# Patient Record
Sex: MALE | Race: WHITE | NOT HISPANIC OR LATINO | Employment: OTHER | URBAN - NONMETROPOLITAN AREA
[De-identification: names, ages, dates, MRNs, and addresses within clinical notes are randomized per-mention and may not be internally consistent; named-entity substitution may affect disease eponyms.]

---

## 2018-02-24 ENCOUNTER — DOCUMENTATION ONLY (OUTPATIENT)
Dept: FAMILY MEDICINE | Facility: OTHER | Age: 58
End: 2018-02-24

## 2018-02-24 PROBLEM — E78.5 HYPERLIPIDEMIA: Status: ACTIVE | Noted: 2018-02-24

## 2018-02-24 PROBLEM — D12.6 BENIGN NEOPLASM OF COLON: Status: ACTIVE | Noted: 2018-02-24

## 2018-02-24 PROBLEM — I26.99 PULMONARY EMBOLISM (H): Status: ACTIVE | Noted: 2018-02-24

## 2018-02-24 PROBLEM — I69.959 HEMIPLEGIA, LATE EFFECT OF CEREBROVASCULAR DISEASE (H): Status: ACTIVE | Noted: 2018-02-24

## 2018-02-24 PROBLEM — Z86.59 PERSONAL HISTORY OF OTHER MENTAL DISORDER: Status: ACTIVE | Noted: 2018-02-24

## 2018-02-24 PROBLEM — Z72.0 TOBACCO ABUSE: Status: ACTIVE | Noted: 2018-02-24

## 2018-02-24 PROBLEM — F10.10 ALCOHOL ABUSE: Status: ACTIVE | Noted: 2018-02-24

## 2021-08-03 ENCOUNTER — APPOINTMENT (OUTPATIENT)
Dept: ULTRASOUND IMAGING | Facility: OTHER | Age: 61
End: 2021-08-03
Attending: PHYSICIAN ASSISTANT
Payer: MEDICARE

## 2021-08-03 ENCOUNTER — HOSPITAL ENCOUNTER (EMERGENCY)
Facility: OTHER | Age: 61
Discharge: HOME | End: 2021-08-03
Attending: PHYSICIAN ASSISTANT | Admitting: PHYSICIAN ASSISTANT
Payer: MEDICARE

## 2021-08-03 VITALS
HEART RATE: 100 BPM | DIASTOLIC BLOOD PRESSURE: 84 MMHG | RESPIRATION RATE: 20 BRPM | WEIGHT: 165 LBS | SYSTOLIC BLOOD PRESSURE: 141 MMHG | HEIGHT: 65 IN | OXYGEN SATURATION: 97 % | BODY MASS INDEX: 27.49 KG/M2 | TEMPERATURE: 97.6 F

## 2021-08-03 DIAGNOSIS — I82.411 ACUTE DEEP VEIN THROMBOSIS (DVT) OF FEMORAL VEIN OF RIGHT LOWER EXTREMITY (H): ICD-10-CM

## 2021-08-03 LAB
ANION GAP SERPL CALCULATED.3IONS-SCNC: 8 MMOL/L (ref 3–14)
BUN SERPL-MCNC: 12 MG/DL (ref 7–25)
CALCIUM SERPL-MCNC: 9.6 MG/DL (ref 8.6–10.3)
CHLORIDE BLD-SCNC: 97 MMOL/L (ref 98–107)
CO2 SERPL-SCNC: 29 MMOL/L (ref 21–31)
CREAT SERPL-MCNC: 1.35 MG/DL (ref 0.7–1.3)
GFR SERPL CREATININE-BSD FRML MDRD: 56 ML/MIN/1.73M2
GLUCOSE BLD-MCNC: 92 MG/DL (ref 70–105)
POTASSIUM BLD-SCNC: 4.1 MMOL/L (ref 3.5–5.1)
SODIUM SERPL-SCNC: 134 MMOL/L (ref 134–144)

## 2021-08-03 PROCEDURE — 99284 EMERGENCY DEPT VISIT MOD MDM: CPT | Mod: 25 | Performed by: PHYSICIAN ASSISTANT

## 2021-08-03 PROCEDURE — 93971 EXTREMITY STUDY: CPT | Mod: RT

## 2021-08-03 PROCEDURE — 80048 BASIC METABOLIC PNL TOTAL CA: CPT | Performed by: PHYSICIAN ASSISTANT

## 2021-08-03 PROCEDURE — 93926 LOWER EXTREMITY STUDY: CPT | Mod: RT

## 2021-08-03 PROCEDURE — 36415 COLL VENOUS BLD VENIPUNCTURE: CPT | Performed by: PHYSICIAN ASSISTANT

## 2021-08-03 PROCEDURE — 99291 CRITICAL CARE FIRST HOUR: CPT | Mod: 25 | Performed by: PHYSICIAN ASSISTANT

## 2021-08-03 PROCEDURE — 99219 PR INITIAL OBSERVATION CARE,LEVEL II: CPT | Performed by: PHYSICIAN ASSISTANT

## 2021-08-03 ASSESSMENT — MIFFLIN-ST. JEOR: SCORE: 1475.32

## 2021-08-03 NOTE — ED NOTES
Vulnerable adult report filed.   Reference number [id number].   at hospital contacted and message left to call ED.

## 2021-08-03 NOTE — ED TRIAGE NOTES
Pt to ER with brother from home via private vehicle with c/o swelling and pain to right leg.  Brother noticed it only today, believe it has been this way for 5 days possibly.  Hx blood clot.  Pain has been limping, seems in pain.  Has not seen PCP, brother is care giver and unsure who PCP is. Smokes 2 pks/day.

## 2021-08-03 NOTE — ED PROVIDER NOTES
History     Chief Complaint   Patient presents with     Leg Swelling     HPI  Damon Mcgowan is a 61 year old male who presents to the emergency department for evaluation with his brother because of swelling to his right lower extremity he notices been increasing over the last 5 days.  He has had a previous history of clot and has been on anticoagulation previous currently he is not on any anticoagulation has had previous stroke he smokes 2 packs a day he does not have any headaches dizziness numbers disturbances runny nose nasal congestion sinus pressure sore throat or cough no chest pain or shortness of breath no abdominal pain rashes or trauma his right lower extremity is more swollen than the left and painful.  It is warm to touch    Allergies:  Allergies   Allergen Reactions     Bee Venom Unknown       Problem List:    Patient Active Problem List    Diagnosis Date Noted     Hemiplegia, late effect of cerebrovascular disease (H) 02/24/2018     Priority: Medium     Overview:   with resultant expressive aphasia and right-sided weakness.  Stroke rehab and   speech therapy at [facility]       Alcohol abuse 02/24/2018     Priority: Medium     Personal history of other mental disorder 02/24/2018     Priority: Medium     Overview:   post stroke       Hyperlipidemia 02/24/2018     Priority: Medium     Pulmonary embolism (H) 02/24/2018     Priority: Medium     Overview:   Massive saddle pulmonary embolus with respiratory failure requiring mechanical   ventilation and thrombolytics.  Hospital course complicated by episode of   pulseless electrical activity with subsequent fevers and negative blood   cultures.       Tobacco abuse 02/24/2018     Priority: Medium     Overview:   one to two packs per day       Benign neoplasm of colon 02/24/2018     Priority: Medium     Clubbing of fingers 12/09/2016     Priority: Medium     History of pulmonary embolism 12/09/2016     Priority: Medium     Poor circulation of  extremity 2016     Priority: Medium     Status post amputation of lesser toe of right foot (H) 2016     Priority: Medium        Past Medical History:    Past Medical History:   Diagnosis Date     Alcohol dependence with withdrawal (H)      Localized swelling, mass or lump of neck      Other specified abnormal findings of blood chemistry      Personal history of other endocrine, nutritional and metabolic disease      Personal history of other specified conditions (CODE)      Saddle embolus of pulmonary artery without acute cor pulmonale (H)        Past Surgical History:    Past Surgical History:   Procedure Laterality Date     COLONOSCOPY      2003,Colonoscopy notable for 2 tubular adenomas with low-grade dysplasia, recommended followup                         colonoscopy in .     ESOPHAGOSCOPY, GASTROSCOPY, DUODENOSCOPY (EGD), COMBINED           OTHER SURGICAL HISTORY      01/10/00,,CAROTID ENDARDECTOMY,Left carotid endarterectomy by Dr. Cordero with subsequent angiography revealing complete occlusion of the left internal carotid artery and extension of infarct     OTHER SURGICAL HISTORY      ,2050,BIOPSY CERVICAL SPINE,Left neck mass biopsy and endoscopy by Dr. Cheng     OTHER SURGICAL HISTORY      3/03/05,,CVL CORONARY ANGIOGRAM,Angiogram and removal of carotid patch graft, Dr. Cordero and Dr. Barlow     OTHER SURGICAL HISTORY      2006,[id number],AMPUTATION,Right fourth an fifth toe amputations /for ulceration and necrosis secondary to ischemia.       Family History:    Family History   Problem Relation Age of Onset     Heart Disease Mother         Heart Disease,98% blocked     Other - See Comments Father         rCOPD and recurrent spontaneous pneumothorax     Other - See Comments Paternal Grandfather          hypercoagulation syndrome.     Genetic Disorder Other         Genetic,Father COPD and recurrent spontaneous pneumothorax -Mother      at age 62 of  "sudden death-Sister       Lupus-PGF and paternal aunt and paternal uncle with hypercoagulation syndrome.     Other - See Comments Sister         Lupus     Other - See Comments Paternal Aunt          hypercoagulation syndrome.     Other - See Comments Paternal Uncle          hypercoagulation syndrome.       Social History:  Marital Status:  Single [1]  Social History     Tobacco Use     Smoking status: Current Every Day Smoker     Packs/day: 3.00     Years: 40.00     Pack years: 120.00     Types: Cigarettes     Smokeless tobacco: Never Used   Substance Use Topics     Alcohol use: None     Comment: daily, 5 beers     Drug use: Not Currently        Medications:    rivaroxaban ANTICOAGULANT (XARELTO) 15 MG TABS tablet          Review of Systems   Please see HPI for pertinent positives and negatives.  All other systems reviewed and found to be negative.      Physical Exam   BP: (!) 153/82  Pulse: 100  Temp: 97.6  F (36.4  C)  Resp: 20  Height: 165.1 cm (5' 5\")  Weight: 74.8 kg (165 lb)  SpO2: 100 %      Physical Exam   Exam:  Constitutional: healthy, alert and no distress  Head: Normocephalic.    Neck: Neck supple.    ENT: ENT exam normal, no neck nodes or sinus tenderness  Cardiovascular:  RRR. No murmurs, clicks gallops or rub  Respiratory:  Lungs clear  Gastrointestinal: Abdomen soft, non-tender. BS normal. No masses, organomegaly  : Deferred  Musculoskeletal: Swelling 2+ distally of the right lower extremity it is warm to touch pulses diminished.  No areas of infection or cellulitis  Skin: no suspicious lesions or rashes   Neurologic: At baseline  Psychiatric: mentation appears normal          ED Course             Results for orders placed or performed during the hospital encounter of 08/03/21 (from the past 24 hour(s))   US Lower Extremity Venous Duplex Right    Narrative    Procedure: US LOWER EXTREMITY VENOUS DUPLEX RIGHT    HISTORY:  pain edema.    TECHNIQUE: Grayscale, color Doppler and compression views of " the deep  venous structures of the right lower extremity.    COMPARISON: None.    FINDINGS:    Interrogation of the deep venous structures from the right common  femoral vein through the veins of the proximal calf demonstrate  extensive predominantly occlusive thrombus throughout the femoral and  popliteal veins. The calf veins are not well assessed. Superficial  thrombus is seen in a portion of the greater saphenous vein.      Impression    IMPRESSION:     Large volume DVT of the right lower extremity. Superficial thrombus of  the right lower extremity as well.      JUAN DIEGO HARVEY MD         SYSTEM ID:  [id number]   US Lower Extremity Arterial Duplex Right    Narrative    US LOWER EXTREMITY ARTERIAL DUPLEX RIGHT    HISTORY: pain edema .    COMPARISON: 8/3/2021.    TECHNIQUE: Grayscale and spectral Doppler assessment of the arteries  of the right lower extremity.    FINDINGS:    Systolic velocity within the major arteries of the right lower  extremity are as follows, in centimeters per second:  Proximal , distal , profunda femoralis to 61, proximal  SFA to 22, mid , distal SFA 79, proximal popliteal 57, distal  popliteal 105, posterior tibial 24, dorsalis pedis 48, peroneal 14.        Impression    IMPRESSION:     Flow acceleration between the distal common femoral artery and the  proximal profunda femoral and SFA arteries suggests mild to moderate  distal CFA/bifurcation stenosis.     No critical flow-limiting arterial stenosis is suggested.      JUAN DIEGO HARVEY MD         SYSTEM ID:  [id number]   Basic metabolic panel   Result Value Ref Range    Sodium 134 134 - 144 mmol/L    Potassium 4.1 3.5 - 5.1 mmol/L    Chloride 97 (L) 98 - 107 mmol/L    Carbon Dioxide (CO2) 29 21 - 31 mmol/L    Anion Gap 8 3 - 14 mmol/L    Urea Nitrogen 12 7 - 25 mg/dL    Creatinine 1.35 (H) 0.70 - 1.30 mg/dL    Calcium 9.6 8.6 - 10.3 mg/dL    Glucose 92 70 - 105 mg/dL    GFR Estimate 56 (L) >60 mL/min/1.73m2        Medications - No data to display    Assessments & Plan (with Medical Decision Making)     There is a high probability of clinically significant deterioration in this patient's condition which required the highest level of my preparedness to intervene urgently.    I have reviewed the nursing notes.    I have reviewed the findings, diagnosis, plan and need for follow up with the patient.Differential diagnosis considerations included strain, sprain, fracture, contusion, dislocation, internal ligament derangement, disk herniation-radiculopathy, arthritis, bursitis, tendonitis, DVT, vascular occlusion, claudication, compartment syndrome, cellulitis.  Leila gentleman who presents emergency department for evaluation of right lower extremity swelling and pain.  Found to have DVT in the right lower extremity venous ultrasound and the arterial ultrasound shows some stenosis.  With the above finding it was recommended that the patient be placed on Xarelto.  He did have a chemistry that showed normal renal function.  He will be placed after discussing with pharmacy on an anticoagulant consistent with Xarelto and he will take 15 mg twice daily for 21 days and then thereafter 20 mg daily.  Prior to the administration of Xarelto we did go over the risk benefits and alternatives the patient has chosen Xarelto versus Coumadin.  Patient also notes that he understands the risk especially of bleeding and if he notices any bleeding or he hits his head or has any traumatic injury he is required and it is recommended to return to the emergency department immediately and seek medical attention.  It is recommended that the patient follow-up with his primary care physician this week so they can follow him closely and continue medications after prescription has .  Independently reviewed the radiographs laboratory studies coordinated care for the patient spoke with the patient at the bedside as well as pharmacy in regards to  his Xarelto prescription and the risk of bleeding and risk while taking the Xarelto and the expected course.  I explained my diagnostic considerations and recommendations and the patient voiced an understanding and was in agreement with the treatment plan. All questions were answered. We discussed potential side effects of any prescribed or recommended therapies, as well as expectations for response to treatments.    New Prescriptions    RIVAROXABAN ANTICOAGULANT (XARELTO) 15 MG TABS TABLET    Take 1 tablet (15 mg) by mouth daily (with dinner)     Aggregate Critical Care Time is 30 minutes.  This was the time seeing the patient at the bedside while the patient was critical.  My time did not include any pertinent procedures or activities that did not contribute to the patient's care while the patient was critical.        Final diagnoses:   Acute deep vein thrombosis (DVT) of femoral vein of right lower extremity (H)       8/3/2021   [facility] PA-C  08/03/21 1941

## 2021-08-03 NOTE — DISCHARGE INSTRUCTIONS
FOLLOW UP WITH YOUR DOCTOR IN THE NEXT 24 HOURS FOR A RECHECK  RETURN TO THE ER IF SYMPTOMS WORSEN OR IF YOU HAVE FURTHER CONCERNS   TAKE ALL PREVIOUS AND ANY NEW MEDS AS PRESCRIBED       THE DISCHARGE INSTRUCTIONS ARE INTENDED AS A COMPLEMENT TO AND NOT A REPLACEMENT FOR THE VERBAL INSTRUCTIONS THAT I HAVE PROVIDED YOU TONIGHT. AFTER GOING OVER THE PLAN OF CARE TONIGHT, INCLUDING SIDE EFFECTS, ADVERSE REACTIONS OF ALL MEDICATIONS PRESCRIBED (THIS WILL BE PROVIDED TO YOU AT THE PHARMACY ALSO) AND PROVIDING YOU WITH THE VERBAL INSTRUCTIONS AT DISCHARGE YOU HAVE HAD THE OPPORTUNITY TO ASK FURTHER QUESTIONS AND TO CLARIFY UNCERTAINTIES. SINCE YOU HAVE NO FURTHER QUESTIONS PLEASE HAVE A WONDERFUL SAFE EVENING THANK YOU.         WORK/SCHOOL NOTE;    PLEASE EXCUSE THE ABOVE PATIENT FROM EITHER WORK OR SCHOOL FOR THE DAY/NIGHT.

## 2021-08-04 ENCOUNTER — OFFICE VISIT (OUTPATIENT)
Dept: FAMILY MEDICINE | Facility: OTHER | Age: 61
End: 2021-08-04
Attending: FAMILY MEDICINE
Payer: MEDICARE

## 2021-08-04 VITALS
RESPIRATION RATE: 20 BRPM | SYSTOLIC BLOOD PRESSURE: 130 MMHG | OXYGEN SATURATION: 93 % | DIASTOLIC BLOOD PRESSURE: 68 MMHG | WEIGHT: 179 LBS | TEMPERATURE: 97.7 F | BODY MASS INDEX: 29.79 KG/M2 | HEART RATE: 92 BPM

## 2021-08-04 DIAGNOSIS — I82.411 ACUTE DEEP VEIN THROMBOSIS (DVT) OF FEMORAL VEIN OF RIGHT LOWER EXTREMITY (H): Primary | ICD-10-CM

## 2021-08-04 DIAGNOSIS — I70.201 FEMORAL ARTERY STENOSIS, RIGHT (H): ICD-10-CM

## 2021-08-04 PROCEDURE — G0463 HOSPITAL OUTPT CLINIC VISIT: HCPCS | Mod: 25

## 2021-08-04 PROCEDURE — 99214 OFFICE O/P EST MOD 30 MIN: CPT | Performed by: FAMILY MEDICINE

## 2021-08-04 PROCEDURE — G0463 HOSPITAL OUTPT CLINIC VISIT: HCPCS

## 2021-08-04 ASSESSMENT — PAIN SCALES - GENERAL: PAINLEVEL: SEVERE PAIN (6)

## 2021-08-04 NOTE — PROGRESS NOTES
"Nursing Notes:   Praveena Sullivan LPN  8/4/2021  2:31 PM  Sign at exiting of workspace  Patient presents to the clinic for ER follow up.  History provided by brother.    FOOD SECURITY SCREENING QUESTIONS  Hunger Vital Signs:  Within the past 12 months we worried whether our food would run out before we got money to buy more. Never  Within the past 12 months the food we bought just didn't last and we didn't have money to get more. Never       Chief Complaint   Patient presents with     Clinic Care Coordination - Follow-up     ER       Initial /68 (BP Location: Right arm, Patient Position: Sitting, Cuff Size: Adult Regular)   Pulse 92   Temp 97.7  F (36.5  C) (Temporal)   Resp 20   Wt 81.2 kg (179 lb)   SpO2 93%   BMI 29.79 kg/m   Estimated body mass index is 29.79 kg/m  as calculated from the following:    Height as of 8/3/21: 1.651 m (5' 5\").    Weight as of this encounter: 81.2 kg (179 lb).  Medication Reconciliation: complete    Praveena Sullivan LPN         Assessment & Plan       ICD-10-CM    1. Acute deep vein thrombosis (DVT) of femoral vein of right lower extremity (H)  I82.411 rivaroxaban ANTICOAGULANT (XARELTO) 15 MG TABS tablet     rivaroxaban ANTICOAGULANT (XARELTO) 20 MG TABS tablet   2. Femoral artery stenosis, right (H)  I70.201 CTA Abdomen Pelvis Bilat Leg Runoff w Contr     Vascular Surgery Referral     Discussed need to treat DVT. Reviewed DOAC vs warfarin. He does not want to check INRs. Xarelto not affordable. He can obtain Xarelto through [facility] under community care. Prescription sent to complete 21 days of Xarelto 15 mg BID, then change to 20 mg daily    For femoral artery stenosis and what sounds to be claudication, recommend CT angio. With current edema on right from DVT, ELVIS may not be accurate. Depending on results, may benefit from vascular surgery consult. Referral placed.    Follow up in a month            Tobacco Cessation:   reports that he has been smoking " "cigarettes. He has a 80.00 pack-year smoking history. He has never used smokeless tobacco.  Tobacco Cessation Action Plan: Information offered: Patient not interested at this time    BMI:   Estimated body mass index is 29.79 kg/m  as calculated from the following:    Height as of 8/3/21: 1.651 m (5' 5\").    Weight as of this encounter: 81.2 kg (179 lb).   Weight management plan: Discussed healthy diet and exercise guidelines      35 minutes spent on the date of the encounter doing chart review, review of test results, patient visit, documentation and discussion with pharmacy     Balaji Huerta MD     [facility]      SUBJECTIVE:  61 year old male presents for follow up on right leg DVT  Patient has aphasia from previous CVA and is able to nod in agreement or shake in disagreement. Brother provides main history   Damon was cared for by sister previously, so some information limited.  History of DVT and PE in 2006. Took warfarin until 2013. Sparse follow up since. It sounds like he did not want to come for INR checks.  Found to have femoral and popliteal thrombus along with superficial thrombus of greater saphenous vein.  Given Xarelto, but he cannot afford 30 days, cost is $600 at [facility]  No SOB or CP    Known PAD. Required toe amputation in the past. He declined tBI and to o see vascular surgery multiple times, including in 2016 with concerning symptoms for ischemia. Would not quit smoking.  Vascular ultrasound in ED shows mild to moderate CFA stenosis.   He does not walk much and nods in approval when I suggest this may be due to leg pain      REVIEW OF SYSTEMS:    Pertinent items are noted in HPI.    Current Outpatient Medications   Medication Sig Dispense Refill     rivaroxaban ANTICOAGULANT (XARELTO) 15 MG TABS tablet Take 1 tablet (15 mg) by mouth daily (with dinner) 30 tablet 0     Allergies   Allergen Reactions     Bee Venom Hives     Possible Anaphylaxis.       OBJECTIVE:  /68 " (BP Location: Right arm, Patient Position: Sitting, Cuff Size: Adult Regular)   Pulse 92   Temp 97.7  F (36.5  C) (Temporal)   Resp 20   Wt 81.2 kg (179 lb)   SpO2 93%   BMI 29.79 kg/m      EXAM:  General Appearance: Alert. No acute distress  Chest/Respiratory Exam: Clear to auscultation bilaterally  Cardiovascular Exam: Regular rate and rhythm. S1, S2, no murmur, gallop, or rubs.  Extremities: Nonpalpable pedal pulses. Previous right 4th and 5th toe amputation. 2+ pitting RLE edema. No edema L LE  Psychiatric: Normal affect and mentation, but aphasia present except for excited mutterances

## 2021-08-04 NOTE — PATIENT INSTRUCTIONS
Take Xarelto 15 mg twice daily for a total of 21 days (use up the [employer] pills plus pills from [place])  Then after 21 days, change to Xarelto 20 mg once daily in the evening.    Recheck in about a month    Referral to vascular surgery due to blockage in artery of the right femoral artery. There might be a way to stent the artery and help with circulation

## 2021-08-04 NOTE — NURSING NOTE
"Patient presents to the clinic for ER follow up.  History provided by brother.    FOOD SECURITY SCREENING QUESTIONS  Hunger Vital Signs:  Within the past 12 months we worried whether our food would run out before we got money to buy more. Never  Within the past 12 months the food we bought just didn't last and we didn't have money to get more. Never       Chief Complaint   Patient presents with     Clinic Care Coordination - Follow-up     ER       Initial /68 (BP Location: Right arm, Patient Position: Sitting, Cuff Size: Adult Regular)   Pulse 92   Temp 97.7  F (36.5  C) (Temporal)   Resp 20   Wt 81.2 kg (179 lb)   SpO2 93%   BMI 29.79 kg/m   Estimated body mass index is 29.79 kg/m  as calculated from the following:    Height as of 8/3/21: 1.651 m (5' 5\").    Weight as of this encounter: 81.2 kg (179 lb).  Medication Reconciliation: complete    Praveena Sullivan LPN    "

## 2021-08-11 ENCOUNTER — HOSPITAL ENCOUNTER (OUTPATIENT)
Dept: CT IMAGING | Facility: OTHER | Age: 61
Discharge: HOME | End: 2021-08-11
Attending: FAMILY MEDICINE | Admitting: FAMILY MEDICINE
Payer: MEDICARE

## 2021-08-11 DIAGNOSIS — I70.201 FEMORAL ARTERY STENOSIS, RIGHT (H): ICD-10-CM

## 2021-08-11 PROCEDURE — 76377 3D RENDER W/INTRP POSTPROCES: CPT | Mod: ME

## 2021-08-11 PROCEDURE — 250N000011 HC RX IP 250 OP 636: Performed by: FAMILY MEDICINE

## 2021-08-11 PROCEDURE — 75635 CT ANGIO ABDOMINAL ARTERIES: CPT | Mod: ME

## 2021-08-11 RX ORDER — IOPAMIDOL 755 MG/ML
40 INJECTION, SOLUTION INTRAVASCULAR ONCE
Status: COMPLETED | OUTPATIENT
Start: 2021-08-11 | End: 2021-08-11

## 2021-08-11 RX ORDER — IOPAMIDOL 755 MG/ML
100 INJECTION, SOLUTION INTRAVASCULAR ONCE
Status: COMPLETED | OUTPATIENT
Start: 2021-08-11 | End: 2021-08-11

## 2021-08-11 RX ADMIN — IOPAMIDOL 40 ML: 755 INJECTION, SOLUTION INTRAVENOUS at 13:12

## 2021-08-11 RX ADMIN — IOPAMIDOL 100 ML: 755 INJECTION, SOLUTION INTRAVENOUS at 12:53

## 2021-09-15 ENCOUNTER — TRANSFERRED RECORDS (OUTPATIENT)
Dept: HEALTH INFORMATION MANAGEMENT | Facility: OTHER | Age: 61
End: 2021-09-15

## 2022-01-10 ENCOUNTER — TELEPHONE (OUTPATIENT)
Dept: FAMILY MEDICINE | Facility: OTHER | Age: 62
End: 2022-01-10
Payer: COMMERCIAL

## 2022-10-04 ENCOUNTER — OFFICE VISIT (OUTPATIENT)
Dept: FAMILY MEDICINE | Facility: OTHER | Age: 62
End: 2022-10-04
Attending: FAMILY MEDICINE
Payer: MEDICARE

## 2022-10-04 VITALS
DIASTOLIC BLOOD PRESSURE: 84 MMHG | OXYGEN SATURATION: 97 % | WEIGHT: 184 LBS | HEART RATE: 84 BPM | TEMPERATURE: 97.8 F | RESPIRATION RATE: 20 BRPM | SYSTOLIC BLOOD PRESSURE: 154 MMHG | BODY MASS INDEX: 29.57 KG/M2 | HEIGHT: 66 IN

## 2022-10-04 DIAGNOSIS — Z72.0 TOBACCO ABUSE: ICD-10-CM

## 2022-10-04 DIAGNOSIS — I69.351 FLACCID HEMIPLEGIA OF RIGHT DOMINANT SIDE AS LATE EFFECT OF CEREBRAL INFARCTION (H): ICD-10-CM

## 2022-10-04 DIAGNOSIS — Z12.11 SPECIAL SCREENING FOR MALIGNANT NEOPLASMS, COLON: ICD-10-CM

## 2022-10-04 DIAGNOSIS — Z86.711 HISTORY OF PULMONARY EMBOLISM: ICD-10-CM

## 2022-10-04 DIAGNOSIS — I69.320 APHASIA AS LATE EFFECT OF CEREBROVASCULAR ACCIDENT: ICD-10-CM

## 2022-10-04 DIAGNOSIS — I70.201 FEMORAL ARTERY STENOSIS, RIGHT (H): ICD-10-CM

## 2022-10-04 DIAGNOSIS — I82.511 CHRONIC DEEP VEIN THROMBOSIS (DVT) OF FEMORAL VEIN OF RIGHT LOWER EXTREMITY (H): Primary | ICD-10-CM

## 2022-10-04 LAB
ALBUMIN SERPL-MCNC: 4.1 G/DL (ref 3.5–5.7)
ALP SERPL-CCNC: 51 U/L (ref 34–104)
ALT SERPL W P-5'-P-CCNC: 11 U/L (ref 7–52)
ANION GAP SERPL CALCULATED.3IONS-SCNC: 7 MMOL/L (ref 3–14)
AST SERPL W P-5'-P-CCNC: 14 U/L (ref 13–39)
BILIRUB SERPL-MCNC: 0.6 MG/DL (ref 0.3–1)
BUN SERPL-MCNC: 8 MG/DL (ref 7–25)
CALCIUM SERPL-MCNC: 9.4 MG/DL (ref 8.6–10.3)
CHLORIDE BLD-SCNC: 100 MMOL/L (ref 98–107)
CO2 SERPL-SCNC: 27 MMOL/L (ref 21–31)
CREAT SERPL-MCNC: 1.3 MG/DL (ref 0.7–1.3)
ERYTHROCYTE [DISTWIDTH] IN BLOOD BY AUTOMATED COUNT: 12.9 % (ref 10–15)
GFR SERPL CREATININE-BSD FRML MDRD: 62 ML/MIN/1.73M2
GLUCOSE BLD-MCNC: 91 MG/DL (ref 70–105)
HCT VFR BLD AUTO: 47.6 % (ref 40–53)
HGB BLD-MCNC: 16.4 G/DL (ref 13.3–17.7)
HOLD SPECIMEN: NORMAL
MCH RBC QN AUTO: 29.7 PG (ref 26.5–33)
MCHC RBC AUTO-ENTMCNC: 34.5 G/DL (ref 31.5–36.5)
MCV RBC AUTO: 86 FL (ref 78–100)
PLATELET # BLD AUTO: 177 10E3/UL (ref 150–450)
POTASSIUM BLD-SCNC: 4 MMOL/L (ref 3.5–5.1)
PROT SERPL-MCNC: 7.2 G/DL (ref 6.4–8.9)
RBC # BLD AUTO: 5.53 10E6/UL (ref 4.4–5.9)
SODIUM SERPL-SCNC: 134 MMOL/L (ref 134–144)
WBC # BLD AUTO: 8.4 10E3/UL (ref 4–11)

## 2022-10-04 PROCEDURE — 85027 COMPLETE CBC AUTOMATED: CPT | Mod: ZL | Performed by: FAMILY MEDICINE

## 2022-10-04 PROCEDURE — 99214 OFFICE O/P EST MOD 30 MIN: CPT | Performed by: FAMILY MEDICINE

## 2022-10-04 PROCEDURE — 36415 COLL VENOUS BLD VENIPUNCTURE: CPT | Mod: ZL | Performed by: FAMILY MEDICINE

## 2022-10-04 PROCEDURE — G0463 HOSPITAL OUTPT CLINIC VISIT: HCPCS | Mod: 25

## 2022-10-04 PROCEDURE — G0463 HOSPITAL OUTPT CLINIC VISIT: HCPCS

## 2022-10-04 PROCEDURE — 80053 COMPREHEN METABOLIC PANEL: CPT | Mod: ZL | Performed by: FAMILY MEDICINE

## 2022-10-04 ASSESSMENT — ANXIETY QUESTIONNAIRES
7. FEELING AFRAID AS IF SOMETHING AWFUL MIGHT HAPPEN: NOT AT ALL
6. BECOMING EASILY ANNOYED OR IRRITABLE: NOT AT ALL
2. NOT BEING ABLE TO STOP OR CONTROL WORRYING: NOT AT ALL
5. BEING SO RESTLESS THAT IT IS HARD TO SIT STILL: NOT AT ALL
GAD7 TOTAL SCORE: 0
IF YOU CHECKED OFF ANY PROBLEMS ON THIS QUESTIONNAIRE, HOW DIFFICULT HAVE THESE PROBLEMS MADE IT FOR YOU TO DO YOUR WORK, TAKE CARE OF THINGS AT HOME, OR GET ALONG WITH OTHER PEOPLE: NOT DIFFICULT AT ALL
7. FEELING AFRAID AS IF SOMETHING AWFUL MIGHT HAPPEN: NOT AT ALL
3. WORRYING TOO MUCH ABOUT DIFFERENT THINGS: NOT AT ALL
8. IF YOU CHECKED OFF ANY PROBLEMS, HOW DIFFICULT HAVE THESE MADE IT FOR YOU TO DO YOUR WORK, TAKE CARE OF THINGS AT HOME, OR GET ALONG WITH OTHER PEOPLE?: NOT DIFFICULT AT ALL
GAD7 TOTAL SCORE: 0
GAD7 TOTAL SCORE: 0
1. FEELING NERVOUS, ANXIOUS, OR ON EDGE: NOT AT ALL
4. TROUBLE RELAXING: NOT AT ALL

## 2022-10-04 ASSESSMENT — PATIENT HEALTH QUESTIONNAIRE - PHQ9
SUM OF ALL RESPONSES TO PHQ QUESTIONS 1-9: 0
10. IF YOU CHECKED OFF ANY PROBLEMS, HOW DIFFICULT HAVE THESE PROBLEMS MADE IT FOR YOU TO DO YOUR WORK, TAKE CARE OF THINGS AT HOME, OR GET ALONG WITH OTHER PEOPLE: NOT DIFFICULT AT ALL
SUM OF ALL RESPONSES TO PHQ QUESTIONS 1-9: 0

## 2022-10-04 ASSESSMENT — PAIN SCALES - GENERAL: PAINLEVEL: NO PAIN (0)

## 2022-10-04 NOTE — LETTER
October 4, 2022      Damon Mcgowan  [address]  [address] [place] [address]        Dear ,    We are writing to inform you of your test results. Electrolytes, liver, and kidney tests are fine.  Blood counts are normal. Continue with Xarelto at 20 mg daily and follow up in a year.    Resulted Orders   Comprehensive Metabolic Panel   Result Value Ref Range    Sodium 134 134 - 144 mmol/L    Potassium 4.0 3.5 - 5.1 mmol/L    Chloride 100 98 - 107 mmol/L    Carbon Dioxide (CO2) 27 21 - 31 mmol/L    Anion Gap 7 3 - 14 mmol/L    Urea Nitrogen 8 7 - 25 mg/dL    Creatinine 1.30 0.70 - 1.30 mg/dL    Calcium 9.4 8.6 - 10.3 mg/dL    Glucose 91 70 - 105 mg/dL    Alkaline Phosphatase 51 34 - 104 U/L    AST 14 13 - 39 U/L    ALT 11 7 - 52 U/L    Protein Total 7.2 6.4 - 8.9 g/dL    Albumin 4.1 3.5 - 5.7 g/dL    Bilirubin Total 0.6 0.3 - 1.0 mg/dL    GFR Estimate 62 >60 mL/min/1.73m2      Comment:      Effective December 21, 2021 eGFRcr in adults is calculated using the 2021 CKD-EPI creatinine equation which includes age and gender (Siri et al., NEJ, DOI: 10.1056/QWJCbi2964315)   CBC W PLT No Diff   Result Value Ref Range    WBC Count 8.4 4.0 - 11.0 10e3/uL    RBC Count 5.53 4.40 - 5.90 10e6/uL    Hemoglobin 16.4 13.3 - 17.7 g/dL    Hematocrit 47.6 40.0 - 53.0 %    MCV 86 78 - 100 fL    MCH 29.7 26.5 - 33.0 pg    MCHC 34.5 31.5 - 36.5 g/dL    RDW 12.9 10.0 - 15.0 %    Platelet Count 177 150 - 450 10e3/uL       If you have any questions or concerns, please call the clinic at the number listed above.       Sincerely,      Balaji Huerta MD

## 2022-10-04 NOTE — NURSING NOTE
"Patient presents to the clinic for medication refill, answers provided by brother.    FOOD SECURITY SCREENING QUESTIONS:    The next two questions are to help us understand your food security.  If you are feeling you need any assistance in this area, we have resources available to support you today.    Hunger Vital Signs:  Within the past 12 months we worried whether our food would run out before we got money to buy more. Never  Within the past 12 months the food we bought just didn't last and we didn't have money to get more. Never    Advance Care Directive on file? no  Advance Care Directive provided to patient? Declined.    Chief Complaint   Patient presents with     Recheck Medication       Initial BP (!) 154/84 (BP Location: Left arm, Patient Position: Sitting, Cuff Size: Adult Large)   Pulse 84   Temp 97.8  F (36.6  C) (Tympanic)   Resp 20   Ht 1.664 m (5' 5.5\")   Wt 83.5 kg (184 lb)   SpO2 97%   BMI 30.15 kg/m   Estimated body mass index is 30.15 kg/m  as calculated from the following:    Height as of this encounter: 1.664 m (5' 5.5\").    Weight as of this encounter: 83.5 kg (184 lb).  Medication Reconciliation: complete        Praveena Sullivan LPN       "

## 2022-10-04 NOTE — PROGRESS NOTES
Assessment & Plan       ICD-10-CM    1. Chronic deep vein thrombosis (DVT) of femoral vein of right lower extremity (H)  I82.511 rivaroxaban ANTICOAGULANT (XARELTO) 20 MG TABS tablet     CBC W PLT No Diff     Comprehensive Metabolic Panel     Comprehensive Metabolic Panel     CBC W PLT No Diff   2. History of pulmonary embolism  Z86.711    3. Femoral artery stenosis, right (H)  I70.201    4. Aphasia as late effect of cerebrovascular accident  I69.320    5. 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 Special screening for malignant neoplasms, colon  Z12.11 ZECHARIAH([employer])   7. Tobacco abuse  Z72.0      Patient has aphasia from previous CVA and is able to nod in agreement or shake in disagreement. He sometimes otters obscenities. Brother provides main history   History of DVT and PE in 2006.  He was on warfarin, but it was discontinued.  Found to have femoral and popliteal thrombus along with superficial thrombus of greater saphenous vein in 2021.  Additionally has known PAD. Required toe amputation in the past.   Vascular ultrasound in 2021 showed mild to moderate CFA stenosis.   Lifelong anticoagulation has been recommended due to past DVT and PE.  Additionally, he saw vascular surgery in 2021 and anticoagulation recommended due to femoral artery stenosis.  Since last year, leg edema on the right has improved.  Functionally he does not sound much better.  Continues to smoke.  Really avoids coming into the doctor.  It seems like his brother is trying to meet patient's desires and have limited interventions.  He is due for lab monitoring.  CBC and CMP normal.  Refilled Xarelto 20 mg daily.  Again, intended for lifelong anticoagulation.    Stable aphasia and hemiparesis    Has not come in for physicals.  Has many vaccinations due.  Qualifies for multiple types of cancer screening.  Elected to discuss colon cancer screening as this could be substantially  "impactful on his existence.  Other types of cancer may be progressive and it sounds like he would not elect in for treatment, but if he had a bowel obstruction from cancer, this is difficult to manage.  Sounds like patient is agreeable to trying Cologuard, which is ordered.       Nicotine/Tobacco Cessation:  He reports that he has been smoking cigarettes. He has a 60.00 pack-year smoking history. He has never used smokeless tobacco.  Nicotine/Tobacco Cessation Plan:   Information offered: Patient not interested at this time      BMI:   Estimated body mass index is 30.15 kg/m  as calculated from the following:    Height as of this encounter: 1.664 m (5' 5.5\").    Weight as of this encounter: 83.5 kg (184 lb).   Weight management plan: Discussed healthy diet and exercise guidelines      Return in about 1 year (around 10/4/2023).    Balaji Huerta MD   [facility] AND \[facility]\""      Nahum Jose is a 62 year old accompanied by his brother, presenting for the following health issues:  Recheck Medication      History of Present Illness       Reason for visit:  Medication refill     Today's PHQ-9         PHQ-9 Total Score: 0    PHQ-9 Q9 Thoughts of better off dead/self-harm past 2 weeks :   Not at all    How difficult have these problems made it for you to do your work, take care of things at home, or get along with other people: Not difficult at all  Today's NGHIA-7 Score: 0       Review of Systems   Unable to assess due to patient factors      Objective    BP (!) 154/84 (BP Location: Left arm, Patient Position: Sitting, Cuff Size: Adult Large)   Pulse 84   Temp 97.8  F (36.6  C) (Tympanic)   Resp 20   Ht 1.664 m (5' 5.5\")   Wt 83.5 kg (184 lb)   SpO2 97%   BMI 30.15 kg/m    Body mass index is 30.15 kg/m .  Physical Exam   General Appearance: Alert. No acute distress. Limited verbal response  Chest/Respiratory Exam: Clear to auscultation bilaterally  Cardiovascular Exam: Regular rate and rhythm. S1, " S2, no murmur, gallop, or rubs.  Extremities: No palpable pedal pulses.  No left lower extremity edema.1+ right pitting, venous stasis changes  Neurological: Not using right arm  Psychiatric: Nods in response and limited verbal responses        Results for orders placed or performed in visit on 10/04/22   Comprehensive Metabolic Panel     Status: Normal   Result Value Ref Range    Sodium 134 134 - 144 mmol/L    Potassium 4.0 3.5 - 5.1 mmol/L    Chloride 100 98 - 107 mmol/L    Carbon Dioxide (CO2) 27 21 - 31 mmol/L    Anion Gap 7 3 - 14 mmol/L    Urea Nitrogen 8 7 - 25 mg/dL    Creatinine 1.30 0.70 - 1.30 mg/dL    Calcium 9.4 8.6 - 10.3 mg/dL    Glucose 91 70 - 105 mg/dL    Alkaline Phosphatase 51 34 - 104 U/L    AST 14 13 - 39 U/L    ALT 11 7 - 52 U/L    Protein Total 7.2 6.4 - 8.9 g/dL    Albumin 4.1 3.5 - 5.7 g/dL    Bilirubin Total 0.6 0.3 - 1.0 mg/dL    GFR Estimate 62 >60 mL/min/1.73m2   CBC W PLT No Diff     Status: Normal   Result Value Ref Range    WBC Count 8.4 4.0 - 11.0 10e3/uL    RBC Count 5.53 4.40 - 5.90 10e6/uL    Hemoglobin 16.4 13.3 - 17.7 g/dL    Hematocrit 47.6 40.0 - 53.0 %    MCV 86 78 - 100 fL    MCH 29.7 26.5 - 33.0 pg    MCHC 34.5 31.5 - 36.5 g/dL    RDW 12.9 10.0 - 15.0 %    Platelet Count 177 150 - 450 10e3/uL   Extra Tube     Status: None    Narrative    The following orders were created for panel order Extra Tube.  Procedure                               Abnormality         Status                     ---------                               -----------         ------                     Extra Serum Separator Tu...[[id number]]                      Final result                 Please view results for these tests on the individual orders.   Extra Serum Separator Tube (SST)     Status: None   Result Value Ref Range    Hold Specimen [facility]

## 2022-10-04 NOTE — LETTER
October 28, 2022      Damon Mcgowan  [address]  [address] [place] [address]        Dear ,    The Cologuard test is negative. This is normal and good news. Plan to repeat colon cancer screening in 3 years.      If you have any questions or concerns, please call the clinic at the number listed above.       Sincerely,      Balaji Huerta MD

## 2022-10-06 PROBLEM — I70.201 FEMORAL ARTERY STENOSIS, RIGHT (H): Status: ACTIVE | Noted: 2022-10-06

## 2022-10-06 PROBLEM — I69.320 APHASIA AS LATE EFFECT OF CEREBROVASCULAR ACCIDENT: Status: ACTIVE | Noted: 2022-10-06

## 2022-10-06 PROBLEM — I82.511 CHRONIC DEEP VEIN THROMBOSIS (DVT) OF FEMORAL VEIN OF RIGHT LOWER EXTREMITY (H): Status: ACTIVE | Noted: 2022-10-06

## 2022-10-28 LAB — NONINV COLON CA DNA+OCC BLD SCRN STL QL: NEGATIVE

## 2023-11-29 DIAGNOSIS — I82.511 CHRONIC DEEP VEIN THROMBOSIS (DVT) OF FEMORAL VEIN OF RIGHT LOWER EXTREMITY (H): ICD-10-CM

## 2023-12-05 NOTE — TELEPHONE ENCOUNTER
Betzy sent Rx request for the following:      Requested Prescriptions   Pending Prescriptions Disp Refills    XARELTO ANTICOAGULANT 20 MG TABS tablet [Pharmacy Med Name: Xarelto 20 MG Oral Tablet (rivaroxaban)] 90 tablet 4     Sig: Take 1 Tablet by mouth one time a day with dinner       Direct Oral Anticoagulant Agents Failed - 12/5/2023  8:52 AM        Failed - Normal Platelets on file in past 12 months     Recent Labs   Lab Test 10/04/22  1512                  Failed - Creatinine Clearance greater than 50 ml/min on file in past 3 mos     No lab results found.          Failed - Serum creatinine less than or equal to 1.4 on file in past 3 mos     Recent Labs   Lab Test 10/04/22  1512   CR 1.30       Ok to refill medication if creatinine is low          Failed - Recent (6 mo) or future (30 days) visit within the authorizing provider's specialty        Last Prescription Date:   10/4/22  Last Fill Qty/Refills:         90, R-4    Last Office Visit:              10/4/22  Future Office visit:           None    Routing refill request to provider for review/approval because:  Patient needs to be seen.    HAO MCNEILL RN on 12/5/2023 at 8:54 AM

## 2023-12-07 RX ORDER — RIVAROXABAN 20 MG/1
TABLET, FILM COATED ORAL
Qty: 90 TABLET | Refills: 0 | Status: SHIPPED | OUTPATIENT
Start: 2023-12-07 | End: 2023-12-21

## 2023-12-07 NOTE — TELEPHONE ENCOUNTER
Patient will be est care with [facility] on 12/21/23. Will transfer Rx request to provider for consideration.     HAO MCNEILL RN on 12/7/2023 at 3:45 PM

## 2023-12-07 NOTE — TELEPHONE ENCOUNTER
Called patient and transferred to scheduling to set up an appointment to [facility] care with a new provider.     HAO MCNEILL RN on 12/7/2023 at 3:35 PM

## 2023-12-21 ENCOUNTER — OFFICE VISIT (OUTPATIENT)
Dept: FAMILY MEDICINE | Facility: OTHER | Age: 63
End: 2023-12-21
Attending: PHYSICIAN ASSISTANT
Payer: MEDICARE

## 2023-12-21 VITALS
TEMPERATURE: 98.5 F | HEIGHT: 68 IN | SYSTOLIC BLOOD PRESSURE: 128 MMHG | RESPIRATION RATE: 22 BRPM | WEIGHT: 177 LBS | HEART RATE: 97 BPM | OXYGEN SATURATION: 100 % | BODY MASS INDEX: 26.83 KG/M2 | DIASTOLIC BLOOD PRESSURE: 80 MMHG

## 2023-12-21 DIAGNOSIS — Z23 NEEDS FLU SHOT: ICD-10-CM

## 2023-12-21 DIAGNOSIS — Z23 NEED FOR VACCINATION FOR PNEUMOCOCCUS: ICD-10-CM

## 2023-12-21 DIAGNOSIS — I82.511 CHRONIC DEEP VEIN THROMBOSIS (DVT) OF FEMORAL VEIN OF RIGHT LOWER EXTREMITY (H): Primary | ICD-10-CM

## 2023-12-21 DIAGNOSIS — I69.351 FLACCID HEMIPLEGIA OF RIGHT DOMINANT SIDE AS LATE EFFECT OF CEREBRAL INFARCTION (H): ICD-10-CM

## 2023-12-21 DIAGNOSIS — I26.99 PULMONARY EMBOLISM, UNSPECIFIED CHRONICITY, UNSPECIFIED PULMONARY EMBOLISM TYPE, UNSPECIFIED WHETHER ACUTE COR PULMONALE PRESENT (H): ICD-10-CM

## 2023-12-21 DIAGNOSIS — Z89.421 STATUS POST AMPUTATION OF LESSER TOE OF RIGHT FOOT (H): ICD-10-CM

## 2023-12-21 DIAGNOSIS — Z23 NEED FOR DIPHTHERIA-TETANUS-PERTUSSIS (TDAP) VACCINE: ICD-10-CM

## 2023-12-21 LAB
ANION GAP SERPL CALCULATED.3IONS-SCNC: 9 MMOL/L (ref 7–15)
BASOPHILS # BLD AUTO: 0 10E3/UL (ref 0–0.2)
BASOPHILS NFR BLD AUTO: 1 %
BUN SERPL-MCNC: 13.6 MG/DL (ref 8–23)
CALCIUM SERPL-MCNC: 9.5 MG/DL (ref 8.8–10.2)
CHLORIDE SERPL-SCNC: 107 MMOL/L (ref 98–107)
CREAT SERPL-MCNC: 1.28 MG/DL (ref 0.67–1.17)
DEPRECATED HCO3 PLAS-SCNC: 27 MMOL/L (ref 22–29)
EGFRCR SERPLBLD CKD-EPI 2021: 62 ML/MIN/1.73M2
EOSINOPHIL # BLD AUTO: 0.1 10E3/UL (ref 0–0.7)
EOSINOPHIL NFR BLD AUTO: 2 %
ERYTHROCYTE [DISTWIDTH] IN BLOOD BY AUTOMATED COUNT: 13.2 % (ref 10–15)
GLUCOSE SERPL-MCNC: 94 MG/DL (ref 70–99)
HCT VFR BLD AUTO: 47.6 % (ref 40–53)
HGB BLD-MCNC: 15.9 G/DL (ref 13.3–17.7)
IMM GRANULOCYTES # BLD: 0 10E3/UL
IMM GRANULOCYTES NFR BLD: 0 %
LYMPHOCYTES # BLD AUTO: 1.8 10E3/UL (ref 0.8–5.3)
LYMPHOCYTES NFR BLD AUTO: 22 %
MCH RBC QN AUTO: 30.2 PG (ref 26.5–33)
MCHC RBC AUTO-ENTMCNC: 33.4 G/DL (ref 31.5–36.5)
MCV RBC AUTO: 91 FL (ref 78–100)
MONOCYTES # BLD AUTO: 0.8 10E3/UL (ref 0–1.3)
MONOCYTES NFR BLD AUTO: 10 %
NEUTROPHILS # BLD AUTO: 5.3 10E3/UL (ref 1.6–8.3)
NEUTROPHILS NFR BLD AUTO: 65 %
NRBC # BLD AUTO: 0 10E3/UL
NRBC BLD AUTO-RTO: 0 /100
PLATELET # BLD AUTO: 201 10E3/UL (ref 150–450)
POTASSIUM SERPL-SCNC: 4 MMOL/L (ref 3.4–5.3)
RBC # BLD AUTO: 5.26 10E6/UL (ref 4.4–5.9)
SODIUM SERPL-SCNC: 143 MMOL/L (ref 135–145)
WBC # BLD AUTO: 8.1 10E3/UL (ref 4–11)

## 2023-12-21 PROCEDURE — 99214 OFFICE O/P EST MOD 30 MIN: CPT | Performed by: PHYSICIAN ASSISTANT

## 2023-12-21 PROCEDURE — 85025 COMPLETE CBC W/AUTO DIFF WBC: CPT | Mod: ZL | Performed by: PHYSICIAN ASSISTANT

## 2023-12-21 PROCEDURE — 80048 BASIC METABOLIC PNL TOTAL CA: CPT | Mod: ZL | Performed by: PHYSICIAN ASSISTANT

## 2023-12-21 PROCEDURE — 90677 PCV20 VACCINE IM: CPT

## 2023-12-21 PROCEDURE — 36415 COLL VENOUS BLD VENIPUNCTURE: CPT | Mod: ZL | Performed by: PHYSICIAN ASSISTANT

## 2023-12-21 PROCEDURE — G0008 ADMIN INFLUENZA VIRUS VAC: HCPCS

## 2023-12-21 PROCEDURE — G0463 HOSPITAL OUTPT CLINIC VISIT: HCPCS | Mod: 25

## 2023-12-21 PROCEDURE — 90686 IIV4 VACC NO PRSV 0.5 ML IM: CPT

## 2023-12-21 ASSESSMENT — PAIN SCALES - GENERAL: PAINLEVEL: NO PAIN (0)

## 2023-12-21 NOTE — PROGRESS NOTES
Assessment & Plan   Problem List Items Addressed This Visit          Nervous and Auditory    Hemiplegia, late effect of cerebrovascular disease (H)       Circulatory    Pulmonary embolism (H)    Chronic deep vein thrombosis (DVT) of femoral vein of right lower extremity (H) - Primary    Relevant Medications    rivaroxaban ANTICOAGULANT (XARELTO ANTICOAGULANT) 20 MG TABS tablet    Other Relevant Orders    CBC and Differential    Basic Metabolic Panel       Other    Status post amputation of lesser toe of right foot (H24)     Other Visit Diagnoses       Need for diphtheria-tetanus-pertussis (Tdap) vaccine        Relevant Medications    Tdap, tetanus-diptheria-acell pertussis, (BOOSTRIX) 5-2.5-18.5 LF-MCG/0.5 SUSP injection    Needs flu shot        Relevant Orders    INFLUENZA VACCINE >6 MONTHS (AFLURIA/FLUZONE) (Completed)    Need for vaccination for pneumococcus        Relevant Orders    PNEUMOCOCCAL 20 VALENT CONJUGATE (PREVNAR 20) (Completed)           Patient was given Prevnar 20 vaccine and flu vaccine.  Sent Tdap vaccine to the patient's pharmacy.    History of chronic DVT of femoral vein of right lower extremity, pulmonary embolism: Continue Xarelto as previously prescribed.  Completed CBC and BMP for monitoring.  No medication changes are warranted at this time.    Status post amputation of lesser toe of the right foot: Stable.  No acute concerns at this time.    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  Doing well.  Continue Xarelto as previously prescribed.  No medication changes are warranted at this time.  Recheck in 1 year for monitoring.    Offered cholesterol and prostate cancer screening however patient declined at this time.  Offered other health screening items such as HIV, hepatitis C, lung cancer screening, and vaccines however patient declined at this time.    Repeat physical in 1 year.    Ordering of each unique test  Prescription drug  "management  30 minutes spent by me on the date of the encounter doing chart review, history and exam, documentation and further activities per the note       Nicotine/Tobacco Cessation:  He reports that he has been smoking cigarettes. He has a 60 pack-year smoking history. He has never used smokeless tobacco.  Nicotine/Tobacco Cessation Plan:   Information offered: Patient not interested at this time      BMI:   Estimated body mass index is 26.91 kg/m  as calculated from the following:    Height as of this encounter: 1.727 m (5' 8\").    Weight as of this encounter: 80.3 kg (177 lb).       See Patient Instructions    No follow-ups on file.    Lien Lau PA-C  [facility] AND [facility]   Damon is a 63 year old, presenting for the following health issues:  Establish Care (Needs xerelto/)        12/21/2023     1:25 PM   Additional Questions   Roomed by Rae MONROY LPN   Accompanied by brother       HPI     Cholesterol/DM concerns/screening: Completed diabetes screening.  Declined cholesterol screening  Prostate cancer screening discussed: None, declines  Family history of colon or prostate CA?:  See family history  Colonoscopy: cologuatoni 10/21/2022, repeat in 3 years  Tobacco use: yes, declines quitting  Lung cancer screening: Declines  Hepatitis C screen: None, declines  HIV screen: None, declines  Immunizations: Would like Prevnar 20 and flu vaccine.  Sent Tdap vaccine to the pharmacy.  Declines other vaccines at this time.    Patient has aphasia from previous CVA.  Able to nod in agreement or say no however brother provides main history.  Brother states that he will sometimes say no even though he means yes.  History of DVT and PE in 2006.  He was on warfarin but it was discontinued.  Found to have femoral and popliteal thrombus along with superficial thrombus of greater saphenous vein in 2021.  Additionally has known PAD. Required toe amputation in the past.   Vascular ultrasound in 2021 showed " "mild to moderate CFA stenosis.   Lifelong anticoagulation has been recommended due to past DVT and PE.  Additionally, he saw vascular surgery in 2021 and anticoagulation recommended due to femoral artery stenosis.  Leg edema on the right has greatly improved.  Functionally he is doing well.  Lives on his own in an apartment.  The other apartment building tenants keep an eye on him.  Continues to smoke.  Declines quitting.  Patient does not like to come to the doctor.  Patient's brother is trying to meet patient's desires and has limited interventions.  Due for lab work for monitoring.  Needs a refill of the Xarelto.  Intended for lifelong anticoagulation.    Stable aphasia and hemiparesis     Has not come in for physicals.  Has many vaccinations due.  Brother requests Prevnar 20 and flu vaccine today.  Would like Tdap vaccine sent to the pharmacy.  Patient had Cologuard completed 1 year ago.  Due for repeat in 2 years.  Patient declines other cancer screening at this time.    Review of Systems   Constitutional, HEENT, cardiovascular, pulmonary, gi and gu systems are negative, except as otherwise noted.      Objective    /80   Pulse 97   Temp 98.5  F (36.9  C) (Tympanic)   Resp 22   Ht 1.727 m (5' 8\")   Wt 80.3 kg (177 lb)   SpO2 100%   BMI 26.91 kg/m    Body mass index is 26.91 kg/m .  Physical Exam  Vitals and nursing note reviewed.   Constitutional:       Appearance: Normal appearance.   HENT:      Head: Normocephalic and atraumatic.      Right Ear: Tympanic membrane, ear canal and external ear normal.      Left Ear: Tympanic membrane, ear canal and external ear normal.      Nose: Nose normal.      Mouth/Throat:      Mouth: Mucous membranes are moist.      Pharynx: Oropharynx is clear. No oropharyngeal exudate or posterior oropharyngeal erythema.   Cardiovascular:      Rate and Rhythm: Normal rate and regular rhythm.      Heart sounds: Normal heart sounds.   Pulmonary:      Effort: Pulmonary effort is " normal.      Breath sounds: Normal breath sounds. No wheezing or rales.   Musculoskeletal:         General: Normal range of motion.      Cervical back: Normal range of motion and neck supple.   Lymphadenopathy:      Cervical: No cervical adenopathy.   Skin:     General: Skin is warm and dry.      Findings: No rash.   Neurological:      General: No focal deficit present.      Mental Status: He is alert and oriented to person, place, and time.      Motor: No weakness or tremor.      Coordination: Coordination is intact.      Gait: Gait is intact.      Comments: Aphasia appreciated during the visit.    Psychiatric:         Mood and Affect: Mood normal.         Behavior: Behavior normal.

## 2023-12-21 NOTE — LETTER
December 21, 2023      Damon Mcgowan  [address]  [address] [place] [address]        Dear Vanessa,    We are writing to inform you of your test results.    Your white blood cell count, hemoglobin, electrolytes, kidney function, and blood sugar are stable.    Resulted Orders   Basic Metabolic Panel   Result Value Ref Range    Sodium 143 135 - 145 mmol/L      Comment:      Reference intervals for this test were updated on 09/26/2023 to more accurately reflect our healthy population. There may be differences in the flagging of prior results with similar values performed with this method. Interpretation of those prior results can be made in the context of the updated reference intervals.     Potassium 4.0 3.4 - 5.3 mmol/L    Chloride 107 98 - 107 mmol/L    Carbon Dioxide (CO2) 27 22 - 29 mmol/L    Anion Gap 9 7 - 15 mmol/L    Urea Nitrogen 13.6 8.0 - 23.0 mg/dL    Creatinine 1.28 (H) 0.67 - 1.17 mg/dL    GFR Estimate 62 >60 mL/min/1.73m2    Calcium 9.5 8.8 - 10.2 mg/dL    Glucose 94 70 - 99 mg/dL   CBC with platelets and differential   Result Value Ref Range    WBC Count 8.1 4.0 - 11.0 10e3/uL    RBC Count 5.26 4.40 - 5.90 10e6/uL    Hemoglobin 15.9 13.3 - 17.7 g/dL    Hematocrit 47.6 40.0 - 53.0 %    MCV 91 78 - 100 fL    MCH 30.2 26.5 - 33.0 pg    MCHC 33.4 31.5 - 36.5 g/dL    RDW 13.2 10.0 - 15.0 %    Platelet Count 201 150 - 450 10e3/uL    % Neutrophils 65 %    % Lymphocytes 22 %    % Monocytes 10 %    % Eosinophils 2 %    % Basophils 1 %    % Immature Granulocytes 0 %    NRBCs per 100 WBC 0 <1 /100    Absolute Neutrophils 5.3 1.6 - 8.3 10e3/uL    Absolute Lymphocytes 1.8 0.8 - 5.3 10e3/uL    Absolute Monocytes 0.8 0.0 - 1.3 10e3/uL    Absolute Eosinophils 0.1 0.0 - 0.7 10e3/uL    Absolute Basophils 0.0 0.0 - 0.2 10e3/uL    Absolute Immature Granulocytes 0.0 <=0.4 10e3/uL    Absolute NRBCs 0.0 10e3/uL       If you have any questions or concerns, please call the clinic at the number listed above.        Sincerely,      Lien Lau PA-C

## 2023-12-21 NOTE — NURSING NOTE
"Chief Complaint   Patient presents with    Establish Care     Needs xerelto         Initial /80   Pulse 97   Temp 98.5  F (36.9  C) (Tympanic)   Resp 22   Ht 1.727 m (5' 8\")   Wt 80.3 kg (177 lb)   SpO2 100%   BMI 26.91 kg/m   Estimated body mass index is 26.91 kg/m  as calculated from the following:    Height as of this encounter: 1.727 m (5' 8\").    Weight as of this encounter: 80.3 kg (177 lb).  Medication Review: complete    The next two questions are to help us understand your food security.  If you are feeling you need any assistance in this area, we have resources available to support you today.           No data to display                  Health Care Directive:  Patient does not have a Health Care Directive or Living Will: Discussed advance care planning with patient; however, patient declined at this time.    Polly Blanton, HORTENSIA      "

## 2023-12-21 NOTE — PATIENT INSTRUCTIONS
"Healthy Strategies  Eat at least 3 meals a day and never skip breakfast.  Eat more slowly.  Decrease portion size.  Provide structure by using meal replacement bars or shakes, and/or low calorie frozen meals.  For good nutrition incorporate fruit, vegetables, whole grains, lean protein, and low-fat dairy.  Remove trigger foods fromyour environment to avoid impulse eating.  Increase physical activity: get a pedometer and aim for 10,000 steps a day or 30-35 minutes of activity 5 days per week.  Weigh yourself daily or at least weekly.  Keep a record of what you eat and your activity.  Establish a support system suchas a friend, group or program.    11. Read Remberto Murillo's \"Eat to Live\". Remember it is important to have a minimum of 1200calories a day, okay to use olive oil, 40 grams of fiber daily. No more than two servings (the size of your palm) of red meat a week.     Please consider the following general health recommendations:    Eat a quality diet (generally, low in simple sugars, starches, cholesterol and saturated fat.)    Please get 1200 mg of calcium in divided doses with 800 units vitamin D in your diet daily. Take supplements as needed to obtain full recommended amounts.    Stay physically active. Regular walking or other exercise is one of the best ways to minimize pain of arthritis; maintain independence and mobility; maintain bone strength; maintain conditioning of your heart. Find something you enjoy and a friend to do it with you.    Maintain ideal weight. Your Body mass index is There is no height or weight on file to calculate BMI.. Generally a BMI of 20-25 is considered ideal. Overweight is defined as 25-30, obese is 30-35 and markedly obese is greater than 35.    Apply sun block (SPF 25 or greater) on exposed skin anytime you are out in the sun to prevent skin cancer.     Wear a seatbelt whenever you are in a car.    Obtain a flu shot every fall.    You should have a tetanus booster at least once " every 10 years.    Colonoscopy (an exam of the colon) is recommended every 10 years after the age of 45 to screen for colon cancer. (More often if you are at increased risk.)    A vaccine to reduce your chances of getting shingles is available if you are over 50. Information about the vaccine is available through the clinic or at:  (https://www.cdc.gov/vaccines/vpd/shingles/public/shingrix/index.html)    Receive a pneumonia shot series after the age of 65 (repeat in 5 years if you have other risk factors). This does not prevent all types of pneumonia, but reduces the risk of the worst bacterial causes of pneumonia.    Check blood sugar annually. Cholesterol annually unless you have had a normal level when last checked within 5 years.     I recommend thatyou have a general physical exam every year.        Blood pressure:   Blood pressure is elevated today. Encouraged to monitor blood pressure a couple times a week over the next few weeks. Return in 3-4 weeks if blood pressure is persistently elevated for a recheck appointment. Work on diet and exercise to decrease blood pressure. Weight loss is helpful.  Decrease salt intake.      -- Learn about DASH Diet (http://[employer]/DASHDiet - redirects to the [facility]) for dietary ways to reduce blood pressure      DASH Diet: Care Instructions  Your Care Instructions     The DASH diet is an eating plan that can help lower your blood pressure. DASH stands for Dietary Approaches to Stop Hypertension. Hypertension is high blood pressure.  The DASH diet focuses on eating foods that are high in calcium, potassium, and magnesium. These nutrients can lower blood pressure. The foods that are highest in these nutrients are fruits, vegetables, low-fat dairy products, nuts, seeds, and legumes. But taking calcium, potassium, and magnesium supplements instead of eating foods that are high in those nutrients does not have the same effect. The DASH diet also includes whole grains, fish, and  poultry.  The DASH diet is one of several lifestyle changes your doctor may recommend to lower your high blood pressure. Your doctor may also want you to decrease the amount of sodium in your diet. Lowering sodium while following the DASH diet can lower blood pressure even further than just the DASH diet alone.  Follow-up care is a key part of your treatment and safety. Be sure to make and go to all appointments, and call your doctor if you are having problems. It's also a good idea to know your test results and keep a list of the medicines you take.  How can you care for yourself at home?  Following the DASH diet  Eat 4 to 5 servings of fruit each day. A serving is 1 medium-sized piece of fruit,   cup chopped or canned fruit, 1/4 cup dried fruit, or 4 ounces (  cup) of fruit juice. Choose fruit more often than fruit juice.  Eat 4 to 5 servings of vegetables each day. A serving is 1 cup of lettuce or raw leafy vegetables,   cup of chopped or cooked vegetables, or 4 ounces (  cup) of vegetable juice. Choose vegetables more often than vegetable juice.  Get 2 to 3 servings of low-fat and fat-free dairy each day. A serving is 8 ounces of milk, 1 cup of yogurt, or 1   ounces of cheese.  Eat 6 to 8 servings of grains each day. A serving is 1 slice of bread, 1 ounce of dry cereal, or   cup of cooked rice, pasta, or cooked cereal. Try to choose whole-grain products as much as possible.  Limit lean meat, poultry, and fish to 2 servings each day. A serving is 3 ounces, about the size of a deck of cards.  Eat 4 to 5 servings of nuts, seeds, and legumes (cooked dried beans, lentils, and split peas) each week. A serving is 1/3 cup of nuts, 2 tablespoons of seeds, or   cup of cooked beans or peas.  Limit fats and oils to 2 to 3 servings each day. A serving is 1 teaspoon of vegetable oil or 2 tablespoons of salad dressing.  Limit sweets and added sugars to 5 servings or less a week. A serving is 1 tablespoon jelly or jam,   cup  "sorbet, or 1 cup of lemonade.  Eat less than 2,300 milligrams (mg) of sodium a day. If you limit your sodium to 1,500 mg a day, you can lower your blood pressure even more.  Be aware that all of these are the suggested number of servings for people who eat 1,800 to 2,000 calories a day. Your recommended number of servings may be different if you need more or fewer calories.  Tips for success  Start small. Do not try to make dramatic changes to your diet all at once. You might feel that you are missing out on your favorite foods and then be more likely to not follow the plan. Make small changes, and stick with them. Once those changes become habit, add a few more changes.  Try some of the following:  Make it a goal to eat a fruit or vegetable at every meal and at snacks. This will make it easy to get the recommended amount of fruits and vegetables each day.  Try yogurt topped with fruit and nuts for a snack or healthy dessert.  Add lettuce, tomato, cucumber, and onion to sandwiches.  Combine a ready-made pizza crust with low-fat mozzarella cheese and lots of vegetable toppings. Try using tomatoes, squash, spinach, broccoli, carrots, cauliflower, and onions.  Have a variety of cut-up vegetables with a low-fat dip as an appetizer instead of chips and dip.  Sprinkle sunflower seeds or chopped almonds over salads. Or try adding chopped walnuts or almonds to cooked vegetables.  Try some vegetarian meals using beans and peas. Add garbanzo or kidney beans to salads. Make burritos and tacos with mashed meehan beans or black beans.  Where can you learn more?  Go to https://www.[employer].net/patiented  Enter H967 in the search box to learn more about \"DASH Diet: Care Instructions.\"  Current as of: March 1, 2023               Content Version: 13.7    6958-5430 [employer].   Care instructions adapted under license by your healthcare professional. If you have questions about a medical condition or this instruction, " always ask your healthcare professional. [employer] disclaims any warranty or liability for your use of this information.         Lowering Your Blood Pressure with DASH  What is the DASH eating plan?  DASH (Dietary Approaches to Stop Hypertension) can help you prevent or lower high blood pressure. This eating plan provides the nutrients that help lower blood pressure: potassium, magnesium, calcium, protein and fiber.   If not controlled, high blood pressure may lead to heart disease, stroke or blindness.  This eating plan is rich in:   Fruits and vegetables  Whole grains  Fat-free or low-fat milk products  Fish and poultry (chicken, turkey, etc.)  Beans, seeds and nuts.  This eating plan is low in:   Salt and sodium  Sugar, sweets and drinks with sugar  Red meat, saturated fat and trans fat.  Lifestyle changes  Besides a healthy eating plan, other steps are needed to help control high blood pressure.   Stay at a healthy weight.  Be active for at least 30 minutes on most days.  If you drink alcohol, have no more than two drinks per day (for men) or one per day (for women).  If you take blood pressure medicine, take it as directed.  Losing weight with DASH  You can lose weight by eating fewer calories. It is best to take off pounds slowly over time. Talk to a dietitian about the best way to do this.  Staying active   To shed pounds, combine DASH with daily exercise. Start with a 15-minute walk each day. Slowly increase the time until you reach a total of 30 minutes on most days. To avoid weight gain, try for 60 minutes each day. Check with your doctor before starting any exercise program.  Tips for less sodium  Avoid processed foods. These may include baked goods, cereals, soy sauce and even antacids.  Cook with less salt. Don't bring the saltshaker to the table.  Season food with herbs, spices, lemon, lime, vinegar, wine and salt-free seasoning blends.  Use low-sodium canned vegetables or fruits.  Tips  to ease the change  Take a week or two to slowly make changes to your diet.  Add a serving of vegetables at lunch one day. The next day, add a serving at dinner as well.  Add fruit to one meal or eat it as a snack.  Work up to three servings of fat-free and low-fat milk products each day.  If you eat large portions of meat, cut back by a third at each meal. The goal is to eat 6 oz (ounces) of meat or less per day.  Serve brown rice and whole-wheat bread and pasta.  Try casseroles and stir-salgado dishes that have less meat and more vegetables, grains or cooked dry beans.  Serve two or more meatless meals each week.  For snacks and desserts, eat foods low in fat, sodium and sugar, such as:  Unsalted rice cakes, nuts or seeds  Fresh fruits, raw vegetables and raisins  Fat-free, low-fat or frozen yogurt  Popcorn with no salt or butter.  If you have trouble digesting milk products, try lactose-free milk or take lactase pills.  If you have a nut allergy, eat seeds, beans, lentils or split peas.  Fruits, vegetables and whole grain foods are high in fiber. To avoid bloating and diarrhea (loose, watery stools), increase these foods over several weeks.  The DASH eating plan  Use this chart to plan your meals. Or take it with you when you shop for food.           Food Group Servings per Day  Serving Size Examples    1,600 Calories 2,000 Calories 2,600 Calories         Grains  (whole wheat) 6 6 to 8 10 to 11 1 slice bread  1 oz dry cereal    cup cooked rice, pasta or cereal Whole-wheat bread and rolls, whole-wheat pasta, English muffin, kedar bread, bagel, cereals, grits, oatmeal, brown rice, unsalted pretzels and popcorn   Vegetables  3 to 4 4 to 5 5 to 6 1 cup raw leafy vegetables    cup cut-up raw or cooked vegetable    cup vegetable juice Broccoli, carrots, collards, green beans, green peas, kale, lima beans, potatoes, spinach, squash, sweet potatoes, tomatoes   Fruits 4 4 to 5 5 to 6 1 medium fruit    cup dried fruit    cup  fresh, frozen, or canned fruit    cup fruit juice Apples, apricots, bananas, dates, grapes, oranges, grapefruit, mangoes, melons, peaches, pineapples, raisins, strawberries, tangerines   Fat-free or   low-fat milk and milk products 2 to 3 2 to 3 3 1 cup milk or yogurt  1   oz cheese Fat-free or low-fat (1%) milk, buttermilk, cheese, regular or frozen yogurt   Lean meats, poultry and fish 3 to 6 6 or less 6 1 oz cooked meats, poultry (chicken, turkey) or fish  1 egg  2 egg whites Lean meats (trim away any fat, then broil, roast or poach); remove skin from poultry. Eggs are high in cholesterol, so limit egg yolks to four or less per week.   Nuts, seeds   and legumes 3 per week 4 to 5 per week 1 per day ? cup (1   oz) nuts  2 Tbsp peanut butter  2 Tbsp (   oz) seeds    cup cooked legumes (dry beans and peas) Almonds, hazelnuts, mixed nuts, peanuts, walnuts, sunflower seeds, peanut butter, kidney beans, lentils, split peas   Fats and oils 2 2 to 3 3 1 tsp soft margarine  1 tsp vegetable oil  1 Tbsp mosher  2 Tbsp salad dressing Soft margarine, vegetable oil (such as canola, corn, olive or safflower), low-fat mayonnaise, light salad dressing   Sweets and   added sugars 0 5 or less per week 2 or less per day 1 Tbsp sugar, jelly or jam    cup sorbet or gelatin  1 cup lemonade Fruit-flavored gelatin, fruit punch, hard candy, jelly, maple syrup, sorbets and ices   A sample meal plan  This sample menu gives two sodium levels. Start with 2,300 mg of sodium (about 1 teaspoon of table salt per day). Then, try to lower your intake to 1,500 mg a day. Talk to your doctor or dietitian about how to do this.   These samples are for people who eat 2,000 calories per day. The less salt you eat, the more you may lower your blood pressure.   Menu for 2,300 mg sodium per day   Breakfast:   cup instant oatmeal, 1 mini whole-wheat bagel, 1 tablespoon peanut butter, 1 medium banana, 1 cup (8 ounces) low-fat milk.   Lunch: 1 cup cantaloupe  "chunks, 1 cup apple juice and one chicken breast sandwich that includes:  Two slices (3 ounces) chicken breast, no skin  Two slices whole-wheat bread  1 slice (   ounce) reduced-fat cheddar cheese  One leaf [place] lettuce  Two slices tomato  1 tablespoon low-fat mayonnaise.  Dinner: 1 cup cooked spaghetti,   cup low-salt vegetarian spaghetti sauce, 3 tablespoons Parmesan cheese;   cup corn (from frozen);   cup canned pears in juice; one spinach salad that includes:  1 cup fresh spinach leaves    cup fresh carrots, grated    cup fresh mushrooms, sliced  1 tablespoon vinegar and oil dressing.  Snacks:? cup unsalted almonds;   cup dried apricots; 1 cup fat-free fruit yogurt, no sugar added.  Reducing to 1,500 mg sodium per day  Use the same menu plan, but:  For breakfast, replace instant oatmeal with regular oatmeal and 1 teaspoon cinnamon.  For lunch, replace cheddar cheese with low-sodium Swiss cheese.  Resources on diet and health  [facility] [place]  [facility] Information [facility]  [address] [address]   [place] [address]   Phone: [phone]   TTY: [phone]   www.nhlbi.nih.gov   \"Aim for a Healthy Weight\"   www.nhlbi.nih.gov/health/public/heart/obesity/lose_wt/index.htm  \"Dietary Guidelines for Americans 2005\"   and \"A Healthier You\"   www.healthierus.gov/dietaryguidelines  For informational purposes only. Not to replace the advice of your health care provider. Adapted from \"Your Guide to Lowering Blood Pressure with DASH,\" by the National [facility], [facility] [place],   NIH Publication No. , revised April 2006. Available at www.nhlbi.nih.gov/health/public/heart/hbp/dash/index.htm. Published by [employer]. [employer] 635727 - REV 09/15.  For informational purposes only. Not to replace the advice of your health care provider.  Copyright   2018 [employer]. All rights reserved.            "

## 2025-06-03 DIAGNOSIS — I82.511 CHRONIC DEEP VEIN THROMBOSIS (DVT) OF FEMORAL VEIN OF RIGHT LOWER EXTREMITY (H): ICD-10-CM

## 2025-06-05 NOTE — TELEPHONE ENCOUNTER
[facility] for the following:      Requested Prescriptions   Pending Prescriptions Disp Refills    XARELTO ANTICOAGULANT 20 MG TABS tablet [Pharmacy Med Name: Rivaroxaban 20 MG Oral Tablet (Xarelto)] 90 tablet 0     Sig: Take 1 Tablet by mouth one time a day with Supper.       Anticoagulant Agents Failed - 6/5/2025 12:54 PM        Failed - Normal Platelets on file in past 12 months     Recent Labs   Lab Test 12/21/23  1403                  Failed - Weight is greater than 60 kg for the past year     Wt Readings from Last 3 Encounters:   12/21/23 80.3 kg (177 lb)   10/04/22 83.5 kg (184 lb)   08/04/21 81.2 kg (179 lb)             Failed - GFR on file in the past 12 months and most recent GFR is normal        Failed - Recent (6 month) or future (90 days) visit with the authorizing provider's specialty (provided they have been seen in the past 9 months)          Last Prescription Date:   3/6/25  Last Fill Qty/Refills:         90, R-0    Last Office Visit:              12/21/23   Future Office visit:            None    Pt due for annual visit. Routing to provider for refill consideration. Routing to Unit scheduling pool, to assist Pt in scheduling appointment.     Mel Garcia RN on 6/5/2025 at 12:57 PM

## 2025-06-07 RX ORDER — RIVAROXABAN 20 MG/1
TABLET, FILM COATED ORAL
Qty: 90 TABLET | Refills: 0 | Status: SHIPPED | OUTPATIENT
Start: 2025-06-07

## 2025-06-09 NOTE — TELEPHONE ENCOUNTER
Caretaker will call back if they want this appt.  They thought he doctored somewhere else.    Marly Cross on 6/9/2025 at 4:00 PM